# Patient Record
Sex: MALE | Race: WHITE | NOT HISPANIC OR LATINO | URBAN - METROPOLITAN AREA
[De-identification: names, ages, dates, MRNs, and addresses within clinical notes are randomized per-mention and may not be internally consistent; named-entity substitution may affect disease eponyms.]

---

## 2018-03-04 ENCOUNTER — EMERGENCY (EMERGENCY)
Facility: HOSPITAL | Age: 38
LOS: 0 days | Discharge: HOME | End: 2018-03-04
Attending: EMERGENCY MEDICINE | Admitting: EMERGENCY MEDICINE
Payer: SELF-PAY

## 2018-03-04 VITALS — HEIGHT: 70 IN | WEIGHT: 179.9 LBS

## 2018-03-04 DIAGNOSIS — F10.129 ALCOHOL ABUSE WITH INTOXICATION, UNSPECIFIED: ICD-10-CM

## 2018-03-04 PROCEDURE — 99285 EMERGENCY DEPT VISIT HI MDM: CPT | Mod: 25

## 2018-03-04 PROCEDURE — 99053 MED SERV 10PM-8AM 24 HR FAC: CPT

## 2018-03-04 RX ORDER — HALOPERIDOL DECANOATE 100 MG/ML
5 INJECTION INTRAMUSCULAR ONCE
Qty: 0 | Refills: 0 | Status: DISCONTINUED | OUTPATIENT
Start: 2018-03-04 | End: 2018-03-04

## 2018-03-04 NOTE — ED PROVIDER NOTE - CONSTITUTIONAL, MLM
normal... Well appearing, well nourished, awake, alert, oriented to person, place, time/situation and in no apparent distress. Intoxicated. well nourished, awake, alert, oriented to person, place, time/situation and in no apparent distress.

## 2018-03-04 NOTE — ED ADULT TRIAGE NOTE - CHIEF COMPLAINT QUOTE
Pt presents with ETOH, BIB PD badge number [id number], uncooperative at triage, cursing at staff, denies any pain.

## 2018-03-04 NOTE — ED PROVIDER NOTE - OBJECTIVE STATEMENT
36 y/o male presents to the ED BIBA s/p ETOH intoxication while at a concert PTA. 38 y/o male presents to the ED BIBA s/p ETOH intoxication while at a concert PTA. Pt states he is fine. Pt is noncooperative, therefore full HPI is unobtainable. 36 y/o male presents to the ED BIBA s/p ETOH intoxication while at a concert PTA. Pt states he is fine. Pt is noncooperative with some questions therefore full HPI is unobtainable. Pt did deny PMHx, is on no meds. States he only drank alcohol, no drugs. Denies allergy to medicines.

## 2018-03-04 NOTE — ED ADULT NURSE NOTE - CHIEF COMPLAINT QUOTE
Pt presents with ETOH, BIB PD badge number [id number], uncooperative at triage, cursing at staff, denies any pain.